# Patient Record
Sex: FEMALE | Race: WHITE | NOT HISPANIC OR LATINO | URBAN - METROPOLITAN AREA
[De-identification: names, ages, dates, MRNs, and addresses within clinical notes are randomized per-mention and may not be internally consistent; named-entity substitution may affect disease eponyms.]

---

## 2024-05-29 ENCOUNTER — LAB REQUISITION (OUTPATIENT)
Dept: LAB | Facility: HOSPITAL | Age: 24
End: 2024-05-29
Payer: COMMERCIAL

## 2024-05-29 DIAGNOSIS — R82.998 OTHER ABNORMAL FINDINGS IN URINE: ICD-10-CM

## 2024-05-29 DIAGNOSIS — R82.90 UNSPECIFIED ABNORMAL FINDINGS IN URINE: ICD-10-CM

## 2024-05-29 DIAGNOSIS — Z01.419 ENCOUNTER FOR GYNECOLOGICAL EXAMINATION (GENERAL) (ROUTINE) WITHOUT ABNORMAL FINDINGS: ICD-10-CM

## 2024-05-29 DIAGNOSIS — R31.29 OTHER MICROSCOPIC HEMATURIA: ICD-10-CM

## 2024-05-29 PROCEDURE — 88175 CYTOPATH C/V AUTO FLUID REDO: CPT

## 2024-05-29 PROCEDURE — 87491 CHLMYD TRACH DNA AMP PROBE: CPT

## 2024-05-29 PROCEDURE — 87591 N.GONORRHOEAE DNA AMP PROB: CPT

## 2024-05-29 PROCEDURE — 87086 URINE CULTURE/COLONY COUNT: CPT

## 2024-05-31 LAB — BACTERIA UR CULT: NORMAL

## 2024-06-03 ENCOUNTER — LAB REQUISITION (OUTPATIENT)
Dept: LAB | Facility: HOSPITAL | Age: 24
End: 2024-06-03
Payer: COMMERCIAL

## 2024-06-03 DIAGNOSIS — Z01.419 ENCOUNTER FOR GYNECOLOGICAL EXAMINATION (GENERAL) (ROUTINE) WITHOUT ABNORMAL FINDINGS: ICD-10-CM

## 2024-06-03 DIAGNOSIS — Z11.3 ENCOUNTER FOR SCREENING FOR INFECTIONS WITH A PREDOMINANTLY SEXUAL MODE OF TRANSMISSION: ICD-10-CM

## 2024-06-03 DIAGNOSIS — Z11.51 ENCOUNTER FOR SCREENING FOR HUMAN PAPILLOMAVIRUS (HPV): ICD-10-CM

## 2024-06-03 LAB
C TRACH RRNA SPEC QL NAA+PROBE: NEGATIVE
N GONORRHOEA DNA SPEC QL PROBE+SIG AMP: NEGATIVE

## 2024-06-14 LAB
CYTOLOGY CMNT CVX/VAG CYTO-IMP: NORMAL
LAB AP HPV GENOTYPE QUESTION: YES
LAB AP HPV HR: NORMAL
LAB AP PAP ADDITIONAL TESTS: NORMAL
LABORATORY COMMENT REPORT: NORMAL
LMP START DATE: NORMAL
PATH REPORT.TOTAL CANCER: NORMAL

## 2024-09-16 ENCOUNTER — OFFICE VISIT (OUTPATIENT)
Dept: URGENT CARE | Age: 24
End: 2024-09-16
Payer: COMMERCIAL

## 2024-09-16 VITALS
HEIGHT: 65 IN | TEMPERATURE: 98 F | HEART RATE: 86 BPM | BODY MASS INDEX: 34.78 KG/M2 | WEIGHT: 208.78 LBS | SYSTOLIC BLOOD PRESSURE: 114 MMHG | DIASTOLIC BLOOD PRESSURE: 77 MMHG | OXYGEN SATURATION: 96 % | RESPIRATION RATE: 16 BRPM

## 2024-09-16 DIAGNOSIS — S61.305A AVULSION OF NAIL OF LEFT RING FINGER: Primary | ICD-10-CM

## 2024-09-16 RX ORDER — MIRTAZAPINE 30 MG/1
1 TABLET, FILM COATED ORAL
COMMUNITY
Start: 2024-08-02

## 2024-09-16 RX ORDER — ARIPIPRAZOLE 5 MG/1
7.5 TABLET ORAL DAILY
COMMUNITY

## 2024-09-16 RX ORDER — TOPIRAMATE 50 MG/1
100 TABLET, FILM COATED ORAL NIGHTLY
COMMUNITY

## 2024-09-16 ASSESSMENT — ENCOUNTER SYMPTOMS
CARDIOVASCULAR NEGATIVE: 1
RESPIRATORY NEGATIVE: 1
SEIZURES: 0
NEUROLOGICAL NEGATIVE: 1
COUGH: 0
PALPITATIONS: 0
ABDOMINAL PAIN: 0
DYSURIA: 0
EYES NEGATIVE: 1
CONSTITUTIONAL NEGATIVE: 1
SHORTNESS OF BREATH: 0
FATIGUE: 0
NERVOUS/ANXIOUS: 0
NECK PAIN: 0
ENDOCRINE NEGATIVE: 1
BACK PAIN: 0
DIARRHEA: 0
SORE THROAT: 0
HEADACHES: 0

## 2024-09-16 ASSESSMENT — PAIN SCALES - GENERAL: PAINLEVEL: 2

## 2024-09-16 NOTE — PROGRESS NOTES
Subjective   Patient ID: Dona Ruiz is a 24 y.o. female. They present today with a chief complaint of ring finger nail injury (Left hand x 1 hour ago. Kickball hit nail and peeled nail back.).    History of Present Illness  Dona Ruiz is a 24 y.o. 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  She came directly here.  She has acrylic nails attached to natural nail.  She denies intentional injury or assault.  She admits to mild bleeding and pain.          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     reports that she has never smoked. She has never used smokeless tobacco. She reports that she does not currently use alcohol. She reports that she does not use drugs.    Review of Systems  Review of Systems   Constitutional: Negative.  Negative for fatigue.   HENT: Negative.  Negative for congestion, dental problem, ear pain and sore throat.    Eyes: Negative.    Respiratory: Negative.  Negative for cough and shortness of breath.    Cardiovascular: Negative.  Negative for chest pain, palpitations and leg swelling.   Gastrointestinal:  Negative for abdominal pain and diarrhea.   Endocrine: Negative.    Genitourinary: Negative.  Negative for dysuria.   Musculoskeletal:  Negative for back pain and neck pain.   Skin: Negative.  Negative for rash.        Nail injury as described in HPI   Neurological: Negative.  Negative for seizures and headaches.  "  Psychiatric/Behavioral:  Negative for behavioral problems. The patient is not nervous/anxious.        Objective    Vitals:    09/16/24 1450   BP: 114/77   BP Location: Left arm   Pulse: 86   Resp: 16   Temp: 36.7 °C (98 °F)   SpO2: 96%   Weight: 94.7 kg (208 lb 12.4 oz)   Height: 1.651 m (5' 5\")     Patient's last menstrual period was 08/19/2024 (approximate).    Physical Exam  Vitals and nursing note reviewed.   Constitutional:       Appearance: Normal appearance.   HENT:      Head: Normocephalic and atraumatic.      Mouth/Throat:      Pharynx: No oropharyngeal exudate or posterior oropharyngeal erythema.   Eyes:      Pupils: Pupils are equal, round, and reactive to light.   Musculoskeletal:         General: No swelling. Normal range of motion.   Skin:     General: Skin is warm and dry.      Comments: Left ring finger nail has acrylic blue nail attached to natural nail  Small sliver of nail observed at nail base is clear/without subungual hematoma  Small amount of dried blood on sides of nail.  Nail is tender to palpation and not manipulated due to patient request.   Neurological:      General: No focal deficit present.      Mental Status: She is alert and oriented to person, place, and time.   Psychiatric:         Mood and Affect: Mood normal.         Procedures    Point of Care Test & Imaging Results from this visit  No results found for this visit on 09/16/24.   No results found.    Diagnostic study results (if any) were reviewed by Penny Kat DO.    Assessment/Plan   Allergies, medications, history, and pertinent labs/EKGs/Imaging reviewed by Penny Kat DO.     Orders and Diagnoses  Diagnoses and all orders for this visit:  Avulsion of nail of left ring finger    Nail injury  - Natural nail attached and used as coverage for disrupted skin  - Soak daily with antibacterial soap (Alyssa dish soap or similar) for 20 minutes  - Antibiotic Ointment topical with bandage/wrap to protect further nail damage  - " May remove artificial nail when tolerated  - Total healing may take weeks      Follow Up Instructions  No follow-ups on file.    Patient disposition: Home    Electronically signed by Penny Kat DO  3:08 PM

## 2024-09-16 NOTE — PATIENT INSTRUCTIONS
Nail injury  - Natural nail attached and used as coverage for disrupted skin  - Soak daily with antibacterial soap (Alyssa dish soap or similar) for 20 minutes  - Antibiotic Ointment topical with bandage/wrap to protect further nail damage  - May remove artificial nail when tolerated  - Total healing may take weeks